# Patient Record
Sex: MALE | Race: WHITE | Employment: OTHER | URBAN - METROPOLITAN AREA
[De-identification: names, ages, dates, MRNs, and addresses within clinical notes are randomized per-mention and may not be internally consistent; named-entity substitution may affect disease eponyms.]

---

## 2020-05-25 ENCOUNTER — APPOINTMENT (OUTPATIENT)
Dept: GENERAL RADIOLOGY | Age: 65
End: 2020-05-25
Attending: STUDENT IN AN ORGANIZED HEALTH CARE EDUCATION/TRAINING PROGRAM
Payer: SUBSIDIZED

## 2020-05-25 ENCOUNTER — HOSPITAL ENCOUNTER (OUTPATIENT)
Age: 65
Setting detail: OBSERVATION
Discharge: HOME | End: 2020-05-26
Attending: EMERGENCY MEDICINE | Admitting: INTERNAL MEDICINE
Payer: SUBSIDIZED

## 2020-05-25 DIAGNOSIS — R07.9 CHEST PAIN, UNSPECIFIED TYPE: Primary | ICD-10-CM

## 2020-05-25 DIAGNOSIS — R94.31 ABNORMAL FINDING ON EKG: ICD-10-CM

## 2020-05-25 LAB
ALBUMIN SERPL-MCNC: 3.7 G/DL (ref 3.5–5)
ALBUMIN/GLOB SERPL: 1 {RATIO} (ref 1.1–2.2)
ALP SERPL-CCNC: 104 U/L (ref 45–117)
ALT SERPL-CCNC: 37 U/L (ref 12–78)
ANION GAP SERPL CALC-SCNC: 6 MMOL/L (ref 5–15)
AST SERPL-CCNC: 30 U/L (ref 15–37)
BASOPHILS # BLD: 0 K/UL (ref 0–0.1)
BASOPHILS NFR BLD: 1 % (ref 0–1)
BILIRUB SERPL-MCNC: 0.4 MG/DL (ref 0.2–1)
BUN SERPL-MCNC: 16 MG/DL (ref 6–20)
BUN/CREAT SERPL: 15 (ref 12–20)
CALCIUM SERPL-MCNC: 8.5 MG/DL (ref 8.5–10.1)
CHLORIDE SERPL-SCNC: 105 MMOL/L (ref 97–108)
CO2 SERPL-SCNC: 25 MMOL/L (ref 21–32)
COMMENT, HOLDF: NORMAL
CREAT SERPL-MCNC: 1.07 MG/DL (ref 0.7–1.3)
D DIMER PPP FEU-MCNC: 2.46 MG/L FEU (ref 0–0.65)
DIFFERENTIAL METHOD BLD: NORMAL
EOSINOPHIL # BLD: 0.1 K/UL (ref 0–0.4)
EOSINOPHIL NFR BLD: 1 % (ref 0–7)
ERYTHROCYTE [DISTWIDTH] IN BLOOD BY AUTOMATED COUNT: 12.1 % (ref 11.5–14.5)
GLOBULIN SER CALC-MCNC: 3.8 G/DL (ref 2–4)
GLUCOSE SERPL-MCNC: 124 MG/DL (ref 65–100)
HCT VFR BLD AUTO: 47.2 % (ref 36.6–50.3)
HGB BLD-MCNC: 16 G/DL (ref 12.1–17)
IMM GRANULOCYTES # BLD AUTO: 0 K/UL (ref 0–0.04)
IMM GRANULOCYTES NFR BLD AUTO: 0 % (ref 0–0.5)
LYMPHOCYTES # BLD: 1.1 K/UL (ref 0.8–3.5)
LYMPHOCYTES NFR BLD: 18 % (ref 12–49)
MCH RBC QN AUTO: 31.9 PG (ref 26–34)
MCHC RBC AUTO-ENTMCNC: 33.9 G/DL (ref 30–36.5)
MCV RBC AUTO: 94 FL (ref 80–99)
MONOCYTES # BLD: 0.3 K/UL (ref 0–1)
MONOCYTES NFR BLD: 5 % (ref 5–13)
NEUTS SEG # BLD: 4.5 K/UL (ref 1.8–8)
NEUTS SEG NFR BLD: 75 % (ref 32–75)
NRBC # BLD: 0 K/UL (ref 0–0.01)
NRBC BLD-RTO: 0 PER 100 WBC
PLATELET # BLD AUTO: 188 K/UL (ref 150–400)
PMV BLD AUTO: 10.3 FL (ref 8.9–12.9)
POTASSIUM SERPL-SCNC: 4 MMOL/L (ref 3.5–5.1)
PROT SERPL-MCNC: 7.5 G/DL (ref 6.4–8.2)
RBC # BLD AUTO: 5.02 M/UL (ref 4.1–5.7)
SAMPLES BEING HELD,HOLD: NORMAL
SODIUM SERPL-SCNC: 136 MMOL/L (ref 136–145)
TROPONIN I SERPL-MCNC: <0.05 NG/ML
TROPONIN I SERPL-MCNC: <0.05 NG/ML
WBC # BLD AUTO: 6 K/UL (ref 4.1–11.1)

## 2020-05-25 PROCEDURE — 36415 COLL VENOUS BLD VENIPUNCTURE: CPT

## 2020-05-25 PROCEDURE — 85379 FIBRIN DEGRADATION QUANT: CPT

## 2020-05-25 PROCEDURE — 99218 HC RM OBSERVATION: CPT

## 2020-05-25 PROCEDURE — 74011250637 HC RX REV CODE- 250/637: Performed by: EMERGENCY MEDICINE

## 2020-05-25 PROCEDURE — 84484 ASSAY OF TROPONIN QUANT: CPT

## 2020-05-25 PROCEDURE — 93005 ELECTROCARDIOGRAM TRACING: CPT

## 2020-05-25 PROCEDURE — 99284 EMERGENCY DEPT VISIT MOD MDM: CPT

## 2020-05-25 PROCEDURE — 85025 COMPLETE CBC W/AUTO DIFF WBC: CPT

## 2020-05-25 PROCEDURE — 71046 X-RAY EXAM CHEST 2 VIEWS: CPT

## 2020-05-25 PROCEDURE — 80053 COMPREHEN METABOLIC PANEL: CPT

## 2020-05-25 RX ORDER — SODIUM CHLORIDE 0.9 % (FLUSH) 0.9 %
5-40 SYRINGE (ML) INJECTION AS NEEDED
Status: DISCONTINUED | OUTPATIENT
Start: 2020-05-25 | End: 2020-05-26 | Stop reason: HOSPADM

## 2020-05-25 RX ORDER — HYDRALAZINE HYDROCHLORIDE 20 MG/ML
10 INJECTION INTRAMUSCULAR; INTRAVENOUS
Status: DISCONTINUED | OUTPATIENT
Start: 2020-05-25 | End: 2020-05-26 | Stop reason: HOSPADM

## 2020-05-25 RX ORDER — LISINOPRIL 10 MG/1
10 TABLET ORAL DAILY
Status: DISCONTINUED | OUTPATIENT
Start: 2020-05-26 | End: 2020-05-26 | Stop reason: HOSPADM

## 2020-05-25 RX ORDER — LISINOPRIL 5 MG/1
5 TABLET ORAL DAILY
Status: ON HOLD | COMMUNITY
End: 2020-05-26 | Stop reason: SDUPTHER

## 2020-05-25 RX ORDER — NITROGLYCERIN 0.4 MG/1
0.4 TABLET SUBLINGUAL
Status: DISCONTINUED | OUTPATIENT
Start: 2020-05-25 | End: 2020-05-25

## 2020-05-25 RX ORDER — SODIUM CHLORIDE 0.9 % (FLUSH) 0.9 %
5-40 SYRINGE (ML) INJECTION EVERY 8 HOURS
Status: DISCONTINUED | OUTPATIENT
Start: 2020-05-25 | End: 2020-05-26 | Stop reason: HOSPADM

## 2020-05-25 RX ADMIN — NITROGLYCERIN 1 INCH: 20 OINTMENT TOPICAL at 20:46

## 2020-05-25 RX ADMIN — Medication 10 ML: at 22:47

## 2020-05-25 NOTE — ED TRIAGE NOTES
Triage Note: Patient is coming in with chest pain for about 90 minutes. Denies SOB but states having some nausea. Denies any previous cardiac history. Patient states pain started while watching TV today.

## 2020-05-25 NOTE — ED PROVIDER NOTES
HPI     23-year-old male with a history of high blood pressure and high cholesterol presents the emergency department with intermittent chest burning since about 2:00 today. He reports associated nausea and shortness of breath. Denies diaphoresis or radiation of pain. He denies any recent exertional chest pain shortness of breath. He has no calf pain or swelling. He denies any fever or cough. Denies any known COVID-19 exposure. He started lisinopril for the first time today. He states his brother had bypass surgery and his dad had his first heart attack in his 45s. He quit smoking in . He currently currently has 6 out of 10 \"burning\" in his chest.    Past Medical History:   Diagnosis Date    Essential hypertension     Hyperlipidemia        No past surgical history on file. No family history on file.     Social History     Socioeconomic History    Marital status:      Spouse name: Not on file    Number of children: Not on file    Years of education: Not on file    Highest education level: Not on file   Occupational History    Not on file   Social Needs    Financial resource strain: Not on file    Food insecurity     Worry: Not on file     Inability: Not on file    Transportation needs     Medical: Not on file     Non-medical: Not on file   Tobacco Use    Smoking status: Former Smoker     Last attempt to quit: 2002     Years since quittin.8    Smokeless tobacco: Never Used   Substance and Sexual Activity    Alcohol use: Not on file    Drug use: Not on file    Sexual activity: Not on file   Lifestyle    Physical activity     Days per week: Not on file     Minutes per session: Not on file    Stress: Not on file   Relationships    Social connections     Talks on phone: Not on file     Gets together: Not on file     Attends Mandaen service: Not on file     Active member of club or organization: Not on file     Attends meetings of clubs or organizations: Not on file Relationship status: Not on file    Intimate partner violence     Fear of current or ex partner: Not on file     Emotionally abused: Not on file     Physically abused: Not on file     Forced sexual activity: Not on file   Other Topics Concern    Not on file   Social History Narrative    Not on file         ALLERGIES: Penicillins    Review of Systems   Constitutional: Negative for fever. HENT: Negative for congestion. Eyes: Negative for visual disturbance. Respiratory: Positive for chest tightness and shortness of breath. Negative for cough. Cardiovascular: Positive for chest pain. Gastrointestinal: Positive for nausea. Negative for abdominal pain and vomiting. Genitourinary: Negative for dysuria. Musculoskeletal: Negative for gait problem. Skin: Negative for rash. Neurological: Negative for headaches. Psychiatric/Behavioral: Negative for dysphoric mood. Vitals:    05/25/20 1652   BP: 187/72   Pulse: 62   Resp: 20   Temp: 98 °F (36.7 °C)   SpO2: 94%   Weight: 90.7 kg (200 lb)   Height: 5' 7\" (1.702 m)            Physical Exam  Constitutional:       General: He is not in acute distress. Appearance: He is well-developed. HENT:      Head: Normocephalic and atraumatic. Mouth/Throat:      Pharynx: No oropharyngeal exudate. Eyes:      General: No scleral icterus. Right eye: No discharge. Left eye: No discharge. Pupils: Pupils are equal, round, and reactive to light. Neck:      Musculoskeletal: Normal range of motion and neck supple. Vascular: No JVD. Cardiovascular:      Rate and Rhythm: Normal rate and regular rhythm. Heart sounds: Normal heart sounds. No murmur. Pulmonary:      Effort: Pulmonary effort is normal. No respiratory distress. Breath sounds: Normal breath sounds. No stridor. No wheezing or rales. Chest:      Chest wall: No tenderness. Abdominal:      General: Bowel sounds are normal. There is no distension. Palpations: Abdomen is soft. There is no mass. Tenderness: There is no abdominal tenderness. There is no guarding or rebound. Musculoskeletal: Normal range of motion. Skin:     General: Skin is warm and dry. Capillary Refill: Capillary refill takes less than 2 seconds. Findings: No rash. Neurological:      Mental Status: He is oriented to person, place, and time. Psychiatric:         Behavior: Behavior normal.         Thought Content: Thought content normal.         Judgment: Judgment normal.          MDM       Procedures        ED EKG interpretation:  Rhythm: sinus bradycardia; and regular . Rate (approx.): 59; Axis: normal; P wave: normal; QRS interval:  New T wave inversion in aVL compared to prior EKG of 2011This EKG was interpreted by Marlena Hurley MD,ED Provider. Given risk factors, new flipped t wave in avL will try nitro for pain and plan for admission. Pain resolved prior to RN giving nitro so Nitro paste was ordered instead      Hospitalist Perfect Serve for Admission  8:31 PM    ED Room Number: ER09/09  Patient Name and age:  Yamil Shelton 60 y.o.  male  Working Diagnosis:   1. Chest pain, unspecified type    2. Abnormal finding on EKG        COVID-19 Suspicion:  no    Code Status:  Full Code  Readmission: no  Isolation Requirements:  no  Recommended Level of Care:  telemetry  Department:[facility] Adult ED - (21 [phone]  Other:  60year old male with htn, chol, strong family history of CAD presents with chest burning with SOB. New flipped T wave in aVL. Initial trop neg. Took an aspirin this morning. I've put nitro paste on.

## 2020-05-26 ENCOUNTER — APPOINTMENT (OUTPATIENT)
Dept: NON INVASIVE DIAGNOSTICS | Age: 65
End: 2020-05-26
Attending: INTERNAL MEDICINE
Payer: SUBSIDIZED

## 2020-05-26 ENCOUNTER — APPOINTMENT (OUTPATIENT)
Dept: NUCLEAR MEDICINE | Age: 65
End: 2020-05-26
Attending: INTERNAL MEDICINE
Payer: SUBSIDIZED

## 2020-05-26 ENCOUNTER — APPOINTMENT (OUTPATIENT)
Dept: CT IMAGING | Age: 65
End: 2020-05-26
Attending: INTERNAL MEDICINE
Payer: SUBSIDIZED

## 2020-05-26 VITALS
HEART RATE: 62 BPM | WEIGHT: 208.56 LBS | HEIGHT: 67 IN | OXYGEN SATURATION: 96 % | RESPIRATION RATE: 18 BRPM | BODY MASS INDEX: 32.73 KG/M2 | SYSTOLIC BLOOD PRESSURE: 175 MMHG | TEMPERATURE: 98.2 F | DIASTOLIC BLOOD PRESSURE: 57 MMHG

## 2020-05-26 LAB
ATRIAL RATE: 59 BPM
CALCULATED P AXIS, ECG09: 14 DEGREES
CALCULATED R AXIS, ECG10: 1 DEGREES
CALCULATED T AXIS, ECG11: 72 DEGREES
CHOLEST SERPL-MCNC: 277 MG/DL
DIAGNOSIS, 93000: NORMAL
EST. AVERAGE GLUCOSE BLD GHB EST-MCNC: 120 MG/DL
HBA1C MFR BLD: 5.8 % (ref 4–5.6)
HDLC SERPL-MCNC: 48 MG/DL
HDLC SERPL: 5.8 {RATIO} (ref 0–5)
LDLC SERPL CALC-MCNC: 191.6 MG/DL (ref 0–100)
LIPID PROFILE,FLP: ABNORMAL
P-R INTERVAL, ECG05: 186 MS
Q-T INTERVAL, ECG07: 442 MS
QRS DURATION, ECG06: 76 MS
QTC CALCULATION (BEZET), ECG08: 437 MS
STRESS BASELINE DIAS BP: 86 MMHG
STRESS BASELINE HR: 51 BPM
STRESS BASELINE SYS BP: 186 MMHG
STRESS ESTIMATED WORKLOAD: 1 METS
STRESS EXERCISE DUR MIN: NORMAL
STRESS PEAK DIAS BP: 87 MMHG
STRESS PEAK SYS BP: 191 MMHG
STRESS PERCENT HR ACHIEVED: 53 %
STRESS POST PEAK HR: 83 BPM
STRESS RATE PRESSURE PRODUCT: NORMAL BPM*MMHG
STRESS STAGE RECOVERY 1 BP: NORMAL MMHG
STRESS STAGE RECOVERY 1 HR: 64 BPM
STRESS TARGET HR: 156 BPM
TRIGL SERPL-MCNC: 187 MG/DL (ref ?–150)
TROPONIN I SERPL-MCNC: <0.05 NG/ML
TSH SERPL DL<=0.05 MIU/L-ACNC: 1.82 UIU/ML (ref 0.36–3.74)
VENTRICULAR RATE, ECG03: 59 BPM
VLDLC SERPL CALC-MCNC: 37.4 MG/DL

## 2020-05-26 PROCEDURE — 99218 HC RM OBSERVATION: CPT

## 2020-05-26 PROCEDURE — 74011250637 HC RX REV CODE- 250/637: Performed by: INTERNAL MEDICINE

## 2020-05-26 PROCEDURE — A9500 TC99M SESTAMIBI: HCPCS

## 2020-05-26 PROCEDURE — 74011636320 HC RX REV CODE- 636/320: Performed by: RADIOLOGY

## 2020-05-26 PROCEDURE — 71275 CT ANGIOGRAPHY CHEST: CPT

## 2020-05-26 PROCEDURE — 78452 HT MUSCLE IMAGE SPECT MULT: CPT

## 2020-05-26 PROCEDURE — 83036 HEMOGLOBIN GLYCOSYLATED A1C: CPT

## 2020-05-26 PROCEDURE — 74011250637 HC RX REV CODE- 250/637: Performed by: SPECIALIST

## 2020-05-26 PROCEDURE — 74011250636 HC RX REV CODE- 250/636: Performed by: INTERNAL MEDICINE

## 2020-05-26 PROCEDURE — 80061 LIPID PANEL: CPT

## 2020-05-26 PROCEDURE — 36415 COLL VENOUS BLD VENIPUNCTURE: CPT

## 2020-05-26 PROCEDURE — 84484 ASSAY OF TROPONIN QUANT: CPT

## 2020-05-26 PROCEDURE — 84443 ASSAY THYROID STIM HORMONE: CPT

## 2020-05-26 RX ORDER — SODIUM CHLORIDE 0.9 % (FLUSH) 0.9 %
10 SYRINGE (ML) INJECTION
Status: COMPLETED | OUTPATIENT
Start: 2020-05-26 | End: 2020-05-26

## 2020-05-26 RX ORDER — SODIUM CHLORIDE 0.9 % (FLUSH) 0.9 %
20 SYRINGE (ML) INJECTION
Status: COMPLETED | OUTPATIENT
Start: 2020-05-26 | End: 2020-05-26

## 2020-05-26 RX ORDER — AMLODIPINE BESYLATE 10 MG/1
10 TABLET ORAL DAILY
Qty: 30 TAB | Refills: 0 | Status: SHIPPED | OUTPATIENT
Start: 2020-05-27 | End: 2020-06-26

## 2020-05-26 RX ORDER — PANTOPRAZOLE SODIUM 40 MG/1
40 TABLET, DELAYED RELEASE ORAL DAILY
Status: DISCONTINUED | OUTPATIENT
Start: 2020-05-26 | End: 2020-05-26 | Stop reason: HOSPADM

## 2020-05-26 RX ORDER — LISINOPRIL 5 MG/1
10 TABLET ORAL DAILY
Qty: 60 TAB | Refills: 0 | Status: SHIPPED | OUTPATIENT
Start: 2020-05-26 | End: 2020-06-25

## 2020-05-26 RX ORDER — PANTOPRAZOLE SODIUM 40 MG/1
40 TABLET, DELAYED RELEASE ORAL DAILY
Qty: 30 TAB | Refills: 0 | Status: SHIPPED | OUTPATIENT
Start: 2020-05-27 | End: 2020-06-26

## 2020-05-26 RX ORDER — ATORVASTATIN CALCIUM 40 MG/1
40 TABLET, FILM COATED ORAL
Qty: 30 TAB | Refills: 0 | Status: SHIPPED | OUTPATIENT
Start: 2020-05-26 | End: 2020-06-25

## 2020-05-26 RX ORDER — ATORVASTATIN CALCIUM 40 MG/1
40 TABLET, FILM COATED ORAL
Status: DISCONTINUED | OUTPATIENT
Start: 2020-05-26 | End: 2020-05-26 | Stop reason: HOSPADM

## 2020-05-26 RX ORDER — AMLODIPINE BESYLATE 5 MG/1
10 TABLET ORAL DAILY
Status: DISCONTINUED | OUTPATIENT
Start: 2020-05-26 | End: 2020-05-26 | Stop reason: HOSPADM

## 2020-05-26 RX ADMIN — IOPAMIDOL 100 ML: 755 INJECTION, SOLUTION INTRAVENOUS at 01:19

## 2020-05-26 RX ADMIN — REGADENOSON 0.4 MG: 0.08 INJECTION, SOLUTION INTRAVENOUS at 12:28

## 2020-05-26 RX ADMIN — PANTOPRAZOLE SODIUM 40 MG: 40 TABLET, DELAYED RELEASE ORAL at 10:05

## 2020-05-26 RX ADMIN — AMLODIPINE BESYLATE 10 MG: 5 TABLET ORAL at 15:27

## 2020-05-26 RX ADMIN — Medication 10 ML: at 01:19

## 2020-05-26 RX ADMIN — LISINOPRIL 10 MG: 10 TABLET ORAL at 10:05

## 2020-05-26 RX ADMIN — Medication 20 ML: at 12:28

## 2020-05-26 RX ADMIN — Medication 10 ML: at 15:28

## 2020-05-26 NOTE — PROGRESS NOTES
Problem: Falls - Risk of  Goal: *Absence of Falls  Description: Document Jamir Cota Fall Risk and appropriate interventions in the flowsheet.   Outcome: Progressing Towards Goal  Note: Fall Risk Interventions:     Medication Interventions: Teach patient to arise slowly, Patient to call before getting OOB       Problem: Unstable angina/NSTEMI: Day of Admission/Day 1  Goal: Nutrition/Diet  Outcome: Progressing Towards Goal  Goal: Psychosocial  Outcome: Progressing Towards Goal  Goal: *Hemodynamically stable  Outcome: Progressing Towards Goal  Goal: *Optimal pain control at patient's stated goal  Outcome: Progressing Towards Goal  Goal: *Lungs clear or at baseline  Outcome: Progressing Towards Goal

## 2020-05-26 NOTE — PROGRESS NOTES
0750 Bedside and Verbal shift change report given to NATHAN Mcneil (oncoming nurse) by Isai Tony (offgoing nurse). Report included the following information SBAR, Kardex, Intake/Output, MAR, Recent Results, Med Rec Status and Cardiac Rhythm NSR/SB. 1445 IVs and telemetry removed. 1455  I have reviewed discharge instructions with the patient. The patient verbalized understanding. Discharge medications reviewed with patient and appropriate educational materials and side effects teaching were provided. MEFs given to pt. Opportunity for questions provided. 1515 Dr. Simran Howard 10 mg Norvasc and check BP in 1.5 hr.     1525 Telemetry placed. Pt updated. Norvasc 10mg administered. Will recheck BP at 1700.     1715 /57. Dr. Simran Howard to discharge patient after he updates the AVS.     1745 Discharge medications reviewed with patient and appropriate educational materials and side effects teaching were provided. I have reviewed discharge instructions with the patient. The patient verbalized understanding. MEFs given to pt. Opportunity for questions provided. 1800 Telemetry removed. Problem: Falls - Risk of  Goal: *Absence of Falls  Description: Document Chris Santo Fall Risk and appropriate interventions in the flowsheet.   Outcome: Progressing Towards Goal  Note: Fall Risk Interventions:            Medication Interventions: Evaluate medications/consider consulting pharmacy, Patient to call before getting OOB, Teach patient to arise slowly, Utilize gait belt for transfers/ambulation                   Problem: Patient Education: Go to Patient Education Activity  Goal: Patient/Family Education  Outcome: Progressing Towards Goal     Problem: Unstable angina/NSTEMI: Day 2  Goal: Activity/Safety  Outcome: Progressing Towards Goal  Goal: Diagnostic Test/Procedures  Outcome: Progressing Towards Goal  Goal: Psychosocial  Outcome: Progressing Towards Goal     Problem: Unstable Angina/NSTEMI: Discharge Outcomes  Goal: *Optimal pain control at patient's stated goal  Outcome: Progressing Towards Goal

## 2020-05-26 NOTE — H&P
[address] [facility] Adult  Hospitalist Group  History and Physical    Primary Care Provider: Adi Orozco MD  Date of Service:  5/25/2020    Subjective:     Jennifer  is a 60 y.o. male with PMH of HTN and hyperlipidemia presents to the ER c/o substernal chest pain that started this afternoon. Patient stated that he was sitting when he suddenly backslash experiencing a burning chest pain 10 out of 10 in the substernal area. It did not radiate. He was associated with dizziness and shortness of breath. It lasted a few seconds and went away. Then he went to the ER and experiencing same pain with intensity this time lasting longer and having associated nausea and dizziness. Since the first episode this afternoon he has had another 5 episode of the same chest pain. It comes and goes. He denies associated headache, abdominal pain, indigestion, diarrhea, fever, chills. No shortness of breath at this time. He prior to this episode he has not noticed dyspnea exertion, chest pain with exertion or decreased exercise tolerance. Last patient had a stress test was 20 years ago and it was done at [facility] for routine physical.  As per patient he was told that it was unremarkable. Patient has history of hypertension has not been on medication. He was seen at the clinic 3 days ago due to have a general checkup. He was started on lisinopril 5 mg for hypertension. He has not been checking his blood pressure at home. Today in the emergency room work-up was unremarkable. He was found to be hypertensive with a systolic blood pressure in the 180s. No medication started. Currently asymptomatic. He has a significant family history of heart disease including father and brothers. Given his risk factor admission for cardiac work-up requested. Review of Systems:    Review of Systems   Constitutional: Negative for chills, fever, malaise/fatigue and weight loss.    HENT: Negative for congestion, ear discharge and hearing loss. Eyes: Negative for blurred vision and double vision. Respiratory: Positive for shortness of breath. Negative for cough, sputum production and wheezing. Cardiovascular: Positive for chest pain. Negative for palpitations, orthopnea, leg swelling and PND. Gastrointestinal: Negative for abdominal pain, constipation, diarrhea, melena, nausea and vomiting. Genitourinary: Negative for dysuria, frequency and urgency. Musculoskeletal: Negative for back pain, joint pain and myalgias. Skin: Negative for itching and rash. Neurological: Positive for dizziness. Negative for sensory change, speech change, focal weakness, weakness and headaches. Endo/Heme/Allergies: Negative for polydipsia. Does not bruise/bleed easily. Past Medical History:   Diagnosis Date    Essential hypertension     Hyperlipidemia       Past Surgical History:   Procedure Laterality Date    HX CATARACT REMOVAL      HX ORTHOPAEDIC Right     right shoulder     Prior to Admission medications    Medication Sig Start Date End Date Taking? Authorizing Provider   lisinopriL (PRINIVIL, ZESTRIL) 5 mg tablet Take 5 mg by mouth daily. Yes Provider, Historical   venlafaxine-SR (EFFEXOR XR) 150 mg capsule Take  by mouth daily. Provider, Historical     Allergies   Allergen Reactions    Penicillins Rash      Family History   Problem Relation Age of Onset    Cancer Mother     Heart Disease Father     Heart Disease Brother         SOCIAL HISTORY:  Patient resides at  Home with wife. Patient ambulates without assistance. Smoking history: Former smoker quit 18 years ago.    Alcohol history: none        Objective:       Physical Exam:   Patient Vitals for the past 12 hrs:   Temp Pulse Resp BP SpO2   05/25/20 2100 98.1 °F (36.7 °C) (!) 56 22 136/51 96 %   05/25/20 1652 98 °F (36.7 °C) 62 20 187/72 94 %     GEN APPEARANCE: Patient resting in bed in NAD  HEENT: Conjunctiva Clear  CVS: RRR, S1, S2; No M/G/R  LUNGS: CTAB; No Wheezes; No Rhonchi: No rales  ABD: Soft; No TTP; + Normoactive BS  EXT: WWP, no edema   Skin exam: No gross lesions noted on exposed skin surfaces  MENTAL STATUS: Answers questions appropriately, responds to commands. NEURO: Cranial nerve exam: EOMI, CN V sensory/motor intact, no facial asymmetry noted, patient able to hearl, uvula midline, able to move tongue left/right. No gross motor or sensory deficits      Data Review:   Recent Results (from the past 24 hour(s))   EKG, 12 LEAD, INITIAL    Collection Time: 05/25/20  4:57 PM   Result Value Ref Range    Ventricular Rate 59 BPM    Atrial Rate 59 BPM    P-R Interval 186 ms    QRS Duration 76 ms    Q-T Interval 442 ms    QTC Calculation (Bezet) 437 ms    Calculated P Axis 14 degrees    Calculated R Axis 1 degrees    Calculated T Axis 72 degrees    Diagnosis Sinus bradycardia  No previous ECGs available      CBC WITH AUTOMATED DIFF    Collection Time: 05/25/20  5:10 PM   Result Value Ref Range    WBC 6.0 4.1 - 11.1 K/uL    RBC 5.02 4.10 - 5.70 M/uL    HGB 16.0 12.1 - 17.0 g/dL    HCT 47.2 36.6 - 50.3 %    MCV 94.0 80.0 - 99.0 FL    MCH 31.9 26.0 - 34.0 PG    MCHC 33.9 30.0 - 36.5 g/dL    RDW 12.1 11.5 - 14.5 %    PLATELET 808 307 - 125 K/uL    MPV 10.3 8.9 - 12.9 FL    NRBC 0.0 0  WBC    ABSOLUTE NRBC 0.00 0.00 - 0.01 K/uL    NEUTROPHILS 75 32 - 75 %    LYMPHOCYTES 18 12 - 49 %    MONOCYTES 5 5 - 13 %    EOSINOPHILS 1 0 - 7 %    BASOPHILS 1 0 - 1 %    IMMATURE GRANULOCYTES 0 0.0 - 0.5 %    ABS. NEUTROPHILS 4.5 1.8 - 8.0 K/UL    ABS. LYMPHOCYTES 1.1 0.8 - 3.5 K/UL    ABS. MONOCYTES 0.3 0.0 - 1.0 K/UL    ABS. EOSINOPHILS 0.1 0.0 - 0.4 K/UL    ABS. BASOPHILS 0.0 0.0 - 0.1 K/UL    ABS. IMM.  GRANS. 0.0 0.00 - 0.04 K/UL    DF AUTOMATED     METABOLIC PANEL, COMPREHENSIVE    Collection Time: 05/25/20  5:10 PM   Result Value Ref Range    Sodium 136 136 - 145 mmol/L    Potassium 4.0 3.5 - 5.1 mmol/L    Chloride 105 97 - 108 mmol/L    CO2 25 21 - 32 mmol/L Anion gap 6 5 - 15 mmol/L    Glucose 124 (H) 65 - 100 mg/dL    BUN 16 6 - 20 MG/DL    Creatinine 1.07 0.70 - 1.30 MG/DL    BUN/Creatinine ratio 15 12 - 20      GFR est AA >60 >60 ml/min/1.73m2    GFR est non-AA >60 >60 ml/min/1.73m2    Calcium 8.5 8.5 - 10.1 MG/DL    Bilirubin, total 0.4 0.2 - 1.0 MG/DL    ALT (SGPT) 37 12 - 78 U/L    AST (SGOT) 30 15 - 37 U/L    Alk. phosphatase 104 45 - 117 U/L    Protein, total 7.5 6.4 - 8.2 g/dL    Albumin 3.7 3.5 - 5.0 g/dL    Globulin 3.8 2.0 - 4.0 g/dL    A-G Ratio 1.0 (L) 1.1 - 2.2     TROPONIN I    Collection Time: 05/25/20  5:10 PM   Result Value Ref Range    Troponin-I, Qt. <0.05 <0.05 ng/mL   SAMPLES BEING HELD    Collection Time: 05/25/20  5:10 PM   Result Value Ref Range    SAMPLES BEING HELD 1RED, 1BLU     COMMENT        Add-on orders for these samples will be processed based on acceptable specimen integrity and analyte stability, which may vary by analyte. TROPONIN I    Collection Time: 05/25/20  8:02 PM   Result Value Ref Range    Troponin-I, Qt. <0.05 <0.05 ng/mL     Xr Chest Pa Lat    Result Date: 5/25/2020  Impression: 1. No acute cardiopulmonary disease       Assessment:     Active Problems:    Chest pain (5/25/2020)        Plan:     1. Atypical chest pain- concerned for CAD given presentation and risk factors. - Stress test in am  - Cardiology consult  - trend Cardiac enzymes  - Aspirin 81mg daily  - Nitroglycerin sublingual  PRN  - Check A1c, TSH, lipid panel     2. Hypertension: uncontrolled hypertension due to lack of compliance with meds  - Increase dose of lisinopril from 5mg daily to 10mg daily. 3. Hyperlipidemia: not on therapy. Check lipid panel. 4. Hyperglycemia: no h/o DM. Check a1c    5. Obesity: BMI 30. Patient will benefit from outpatient management for weight loss, lipid and blood pressure control. DVT prophy: SCD  Code status: Full code    Admit to: telemetry.  Observation     FUNCTIONAL STATUS PRIOR TO HOSPITALIZATION Ambulates Independently     Signed By: Natasha Monsivais MD     May 25, 2020

## 2020-05-26 NOTE — ROUTINE PROCESS
TRANSFER - OUT REPORT: 
 
Verbal report given to [place], RN(name) on [facility] P[facility]  being transferred to [facility](unit) for routine progression of care Report consisted of patients Situation, Background, Assessment and  
Recommendations(SBAR). Information from the following report(s) SBAR, ED Summary, MAR, Recent Results and Cardiac Rhythm SR was reviewed with the receiving nurse. Lines:  
Peripheral IV 05/25/20 Left Antecubital (Active) Site Assessment Clean, dry, & intact 5/25/2020  5:15 PM  
Phlebitis Assessment 0 5/25/2020  5:15 PM  
Infiltration Assessment 0 5/25/2020  5:15 PM  
Dressing Status Clean, dry, & intact 5/25/2020  5:15 PM  
Dressing Type Transparent 5/25/2020  5:15 PM  
Hub Color/Line Status Patent; Flushed;Capped;Pink 5/25/2020  5:15 PM  
Action Taken Blood drawn 5/25/2020  5:15 PM  
  
 
Opportunity for questions and clarification was provided.    
 
Patient transported with: 
 
 
Layton Hair RN

## 2020-05-26 NOTE — PROGRESS NOTES
05/26/20 1515   Vitals   Temp 98.2 °F (36.8 °C)   Temp Source Oral   Pulse (Heart Rate) 60   Heart Rate Source Monitor   Resp Rate 18   O2 Sat (%) 96 %   Level of Consciousness Alert   BP (!) 205/76  (MD aware)   MAP (Monitor) 127   BP 1 Location Left arm   BP 1 Method Automatic   BP Patient Position Sitting   Cardiac Rhythm NSR   MEWS Score 3   Alarms Set and Audible Cardiac alarms   Box Number 21   Electrodes Replaced No     MEWs: 3. Dr. Claudia Schmitt updated. Delay discharge, administer 10 mg of PO norvasc. Telemetry placed and monitor tech aware. Wife, Baby Billy, updated. VORB to recheck vitals 1.5 hr after norvasc.

## 2020-05-26 NOTE — CONSULTS
Cardiology Consult Note      Patient Name: Aisha Alberto  : [birth year] MRN: [id number]  Date: 2020  Time: 11:02 AM    Admit Diagnosis: Chest pain [R07.9]    Primary Cardiologist: Calista Patricia MD   Consulting Cardiologist: Calista Patricia MD    Reason for Consult: chest pain    Requesting MD: Kirt Cardona MD    HPI:  Aisha Alberto is a 60 y.o. male admitted on 2020  for Chest pain [R07.9]. has a past medical history of Essential hypertension and Hyperlipidemia. Presented for chest burning. Began yesterday afternoon. Diffuse across entire chest.  No nausea or SOB. Last seen in our office 9 years ago for same symptoms. Exercise stress was inconclusive, suspected GERD. He was to return for further testing, but never showed. Denies Cp or SOB since, until yesterday. Subjective:  Denies CP, SOB or palpiations      Assessment and Plan     1. Chest pain - atypical   - Burning   - Troponin 0.05 x 3   - EKG without ACS   - Nuclear stress today  2. HTN   - Lisinopril 5 mg PTA  3. HLD   Cholesterol, total 277 (H) 2020 03:10 AM   HDL Cholesterol 48 2020 03:10 AM   LDL, calculated 191.6 (H) 2020 03:10 AM   VLDL, calculated 37.4 2020 03:10 AM   Triglyceride 187 (H) 2020 03:10 AM   CHOL/HDL Ratio 5.8 (H) 2020 03:10 AM    Started on Lipitor. 4. Body mass index is 32.66 kg/m². - obesity. S/sx more c/w GERD. Protonix started. He states he does have reflux. Nuclear stress today. Will follow up results. Cardiology Attending:Patient seen and examined. I agree with NP assessment and plans. Await stress test results. Chnog Mendez MD 2020 12:11 PM         Patient Active Problem List   Diagnosis Code    BP (high blood pressure) I10    Chest pain, unspecified R07.9    Other and unspecified hyperlipidemia E78.5    Chest pain R07.9     No specialty comments available.       Review of Systems: [] Patient unable to provide secondary to condition    [x] All systems negative, except as checked below. Constitutional:    []Weight Change  []Fever   []Chills   []Night Sweats  []Fatigue  []Malaise  []____  ENT/Mouth:     []Hearing Changes  []Ear Pain  []Nasal Congestion   []Sinus Pain  []Hoarseness   []Sore throat  []Rhinorrhea  []Swallowing Difficulty  []____  Eyes:    []Eye Pain  []Swelling  []Redness  []Foreign Body  []Discharge  []Vision Changes  []____  Cardiovascular:    [x]Chest Pain  []SOB  []PND  []TEAGUE  []Orthopnea  []Claudication  []Edema   []Palpitations  []____  Respiratory:    []Cough  []Sputum  []Wheezing,  []SOB  []Hemoptysis  []____  Gastrointestinal:    []Nausea  []Vomiting  []Diarrhea  []Constipation  []Pain  []Heartburn  []Anorexia  []Dysphagia  []Hematochezia  []Melena,  []Jaundice  []____  Genitourinary:    []Dysuria  []Urinary Frequency  []Hematuria  []Urinary Incontinence  []Urgency  []Flank Pain  []Hesitancy  []____  Musculoskeletal:    []Arthralgias  []Myalgias  []Joint Swelling  []Joint Stiffness  []Back Pain  []Neck Pain  []____  Skin:    []Skin Lesions  []Pruritis  []Hair Changes  []Skin rashes  []____  Neuro:    []Weakness  []Numbness  []Paresthesias  []Loss of Consciousness  []Syncope   []Dizziness  []Headache  []Coordination Changes  []Recent Falls  []____  Psych:    []Anxiety/Depression  []Insomnia  []Memory Changes  []Violence/Abuse Hx.  []____  Heme/Lymph:    []Bruising  []Bleeding  []Lymphadenopathy  []____  Endocrine:    []Polyuria  []Polydipsia  []Temperature Intolerance  []____         Previous treatment/evaluation includes   Exercise stress test  Cardiac risk factors:   smoking/ tobacco exposure, dyslipidemia, obesity, sedentary life style, male gender, hypertension.     Past Medical History:   Diagnosis Date    Essential hypertension     Hyperlipidemia      Past Surgical History:   Procedure Laterality Date    HX CATARACT REMOVAL      HX ORTHOPAEDIC Right     right shoulder     Current Facility-Administered Medications   Medication Dose Route Frequency    sodium chloride 0.9 % bolus infusion 100 mL  100 mL IntraVENous RAD ONCE    regadenoson (LEXISCAN) injection 0.4 mg  0.4 mg IntraVENous RAD ONCE    saline peripheral flush soln 20 mL  20 mL InterCATHeter RAD ONCE    atorvastatin (LIPITOR) tablet 40 mg  40 mg Oral QHS    pantoprazole (PROTONIX) tablet 40 mg  40 mg Oral DAILY    sodium chloride (NS) flush 5-40 mL  5-40 mL IntraVENous Q8H    sodium chloride (NS) flush 5-40 mL  5-40 mL IntraVENous PRN    lisinopriL (PRINIVIL, ZESTRIL) tablet 10 mg  10 mg Oral DAILY    hydrALAZINE (APRESOLINE) 20 mg/mL injection 10 mg  10 mg IntraVENous Q6H PRN       Allergies   Allergen Reactions    Penicillins Rash      Family History   Problem Relation Age of Onset    Cancer Mother     Heart Disease Father     Heart Disease Brother       Social History     Socioeconomic History    Marital status:      Spouse name: Not on file    Number of children: Not on file    Years of education: Not on file    Highest education level: Not on file   Tobacco Use    Smoking status: Former Smoker     Last attempt to quit: 2002     Years since quittin.8    Smokeless tobacco: Never Used       Objective:    Physical Exam    Vitals:   Vitals:    20 0023 20 0259 20 0516 20 0752   BP: 132/42 (!) 116/39  132/51   Pulse: (!) 49 (!) 49  (!) 55   Resp: 18 18  18   Temp: 97.7 °F (36.5 °C) 97.7 °F (36.5 °C)  97.7 °F (36.5 °C)   SpO2: 91% 95%  97%   Weight:   208 lb 8.9 oz (94.6 kg)    Height:           General:    Alert, cooperative, no distress, appears stated age. Neck:   Supple, no carotid bruit and no JVD. Back:     Symmetric, normal curvature. Lungs:     Clear to auscultation bilaterally. Heart[de-identified]    Regular rate and rhythm, S1, S2 normal, no murmur, click, rub or gallop. Abdomen:     Soft, non-tender.  Bowel sounds normal.    Extremities: Extremities normal, atraumatic, no cyanosis or edema. Vascular:   Pulses - 2+ radials   Skin:   Skin color normal. No rashes or lesions   Neurologic:   CN II-XII grossly intact. Telemetry: normal sinus rhythm    ECG:   EKG Results     Procedure 720 Value Units Date/Time    EKG 12 LEAD INITIAL [[id number]] Collected:  05/25/20 1657    Order Status:  Completed Updated:  05/25/20 1659     Ventricular Rate 59 BPM      Atrial Rate 59 BPM      P-R Interval 186 ms      QRS Duration 76 ms      Q-T Interval 442 ms      QTC Calculation (Bezet) 437 ms      Calculated P Axis 14 degrees      Calculated R Axis 1 degrees      Calculated T Axis 72 degrees      Diagnosis --     Sinus bradycardia  No previous ECGs available              Data Review:     Radiology:   XR Results (most recent):  Results from Hospital Encounter encounter on 05/25/20   XR CHEST PA LAT    Narrative INDICATION:  Chest pain. Symptoms for 90 minutes     Exam: Chest 2 views. Comparison: None. Findings: Cardiomediastinal silhouette is normal. Pulmonary vasculature is not  engorged. No focal parenchymal opacities, effusions, or pneumothorax. Bony  thorax is intact. Impression Impression:  1. No acute cardiopulmonary disease           Recent Labs     05/26/20  0310 05/25/20 2002 05/25/20  1710   TROIQ <0.05 <0.05 <0.05     Recent Labs     05/25/20  1710      K 4.0      CO2 25   BUN 16   CREA 1.07   *   CA 8.5     Recent Labs     05/25/20  1710   WBC 6.0   HGB 16.0   HCT 47.2        Recent Labs     05/25/20  1710   SGOT 30        Recent Labs     05/26/20  0310   CHOL 277*   LDLC 191.6*     Recent Labs     05/26/20  0310   TSH 1.82       Yrn Valerio MD         [facility] [address] [place] [address], [address] [place] [address] [address]     [facility]     [phone]    Avel Reid MD

## 2020-05-26 NOTE — PROGRESS NOTES
2225: TRANSFER - IN REPORT:    Verbal report received from SHAKILA Padilla(name) on [address]  being received from ED (unit) for routine progression of care      Report consisted of patients Situation, Background, Assessment and   Recommendations(SBAR). Information from the following report(s) SBAR, Kardex, Intake/Output, MAR, Recent Results, Med Rec Status and Cardiac Rhythm SB was reviewed with the receiving nurse. Opportunity for questions and clarification was provided. Assessment completed upon patients arrival to unit and care assumed. Primary Nurse Hamida Gutierres RN and Olivia Galan RN performed a dual skin assessment on this patient No impairment noted  Kemar score is 23.    0100: Patient down for STAT CTA.    0400: Patient resting in bed at his time without any pain at this time. 0730: Bedside and Verbal shift change report given to SHAKILA Back (oncoming nurse) by Donis Luong (offgoing nurse).  Report included the following information SBAR, Kardex, Intake/Output, MAR, Recent Results, Med Rec Status and Cardiac Rhythm SB.

## 2020-05-26 NOTE — PROGRESS NOTES
CM attempted to meet with the patient at bedside for initial assessment, patient currently off-the-floor for testing, CM will follow-up later today as able and appropriate. Per IDR rounds with bedside RN, patient is up at gilda and independent- patient is listed as uninsured, will follow-up with patient directly.  SIRIA Stewart

## 2020-05-26 NOTE — DISCHARGE INSTRUCTIONS
Discharge Instructions       PATIENT ID: Emilee Momin  MRN: [id number]   YOB: 1955    DATE OF ADMISSION: 5/25/2020  7:01 PM    DATE OF DISCHARGE: 5/26/2020    PRIMARY CARE PROVIDER: Marivel Blandon MD     ATTENDING PHYSICIAN: Prachi Pineda MD  DISCHARGING PROVIDER: Marian Zavaleta MD    To contact this individual call [phone] and ask the  to page. If unavailable ask to be transferred the Adult Hospitalist Department. DISCHARGE DIAGNOSES     - Atypical chest pain- concerned for CAD given presentation and risk factors. ACS R/O  Cardiac stress test Neg for reversible ischemia  Cont Baby asa  Given abnormal FLP, start on statins  Symptoms likely due to gerd, will place on PPI trial.     - Hypertension: uncontrolled hypertension due to lack of compliance with meds  Increase dose of lisinopril from 5mg daily to 10mg daily. Norvasc added  Please FU with PCP, check BP daily and record in Journal. Please be compliant with antihypertensives. Patient is advised to return to ER/seek medical care if symptoms recur/ worsen or new symptoms develop.     - Hyperlipidemia: Statins     - Hyperglycemia: no h/o DM. A1c 5.8. prediabetic range. Weight loss recommenced.     - Obesity: BMI 30. Patient will benefit from outpatient management for weight loss, lipid and blood pressure control.     CONSULTATIONS: IP CONSULT TO CARDIOLOGY    PROCEDURES/SURGERIES: * No surgery found *    PENDING TEST RESULTS:   At the time of discharge the following test results are still pending: n/a    FOLLOW UP APPOINTMENTS:   Follow-up Information     Follow up With Specialties Details Why Contact Info    Marivel Blandon MD [facility] Schedule an appointment as soon as possible for a visit in 1 week for post-hospitalization follow up, with in 7 days Vilma Cunningham 106      Alex Drummond MD Cardiology Schedule an appointment as soon as possible for a visit as recommended [address] dim[address]ri [facility]  [phone]             ADDITIONAL CARE RECOMMENDATIONS:   · It is important that you take the medication exactly as they are prescribed. · Keep your medication in the bottles provided by the pharmacist and keep a list of the medication names, dosages, and times to be taken in your wallet. · Do not take other medications without consulting your doctor. · No drinking alcohol or driving car or operating machinery if you are on narcotic pain medications. Donot take sedating mediations if you are sleepy or confused. · Fall Precautions  · Keep Well Hydrated  · Report to your medical provider if you feel you have  developed allergies to medications  · Follow up with your PCP or Consultant for medication adjustments and refills  · Monitor for signs of fevers,chills,b leeding,chest pain and seek medical attention if you do so. DIET: cardiac    ACTIVITY: Ambulate in house    WOUND CARE: n/a    EQUIPMENT needed: n/a      DISCHARGE MEDICATIONS:   See Medication Reconciliation Form    · It is important that you take the medication exactly as they are prescribed. · Keep your medication in the bottles provided by the pharmacist and keep a list of the medication names, dosages, and times to be taken in your wallet. · Do not take other medications without consulting your doctor. NOTIFY YOUR PHYSICIAN FOR ANY OF THE FOLLOWING:   Fever over 101 degrees for 24 hours. Chest pain, shortness of breath, fever, chills, nausea, vomiting, diarrhea, change in mentation, falling, weakness, bleeding. Severe pain or pain not relieved by medications. Or, any other signs or symptoms that you may have questions about.       DISPOSITION:   x Home With:   OT  PT  HH  RN       SNF/Inpatient Rehab/LTAC    Independent/assisted living    Hospice    Other:           Information obtained by :   I understand that if any problems occur once I am at home I am to contact my physician. I understand and acknowledge receipt of the instructions indicated above.                                                                                                                                              [de-identified] or R.N.'s Signature                                                                  Date/Time                                                                                                                                              Patient or Representative Signature                                                          Date/Time          Signed:   Marcia Reyna MD  5/26/2020  2:33 PM

## 2020-05-26 NOTE — DISCHARGE SUMMARY
Inpatient hospitalist discharge summary                Brief Overview    PATIENT ID: Lyndsey Sharma    MRN: [id number]     [facility] OF BIRTH: 1955    Admitting Provider: Dylon Montero MD    Discharging Provider: Servando Cuenca MD   To contact this individual call [phone] and ask the  to page. If unavailable ask to be transferred the Adult Hospitalist Department. PCP at discharge: Delvis Cotto, [address] [place], [place] [address]ise [address] / [facility] [id number]    Admission date: 5/25/2020  Date of Discharge: 05/26/20    Chief complaint:   Chief Complaint   Patient presents with    Chest Pain     Patient Active Problem List   Diagnosis Code    BP (high blood pressure) I10    Chest pain, unspecified R07.9    Other and unspecified hyperlipidemia E78.5    Chest pain R07.9         Discharge diagnosis, hospital course/plan:    - Atypical chest pain- concerned for CAD given presentation and risk factors. ACS R/O  Cardiac stress test Neg for reversible ischemia  Cont Baby asa  Given abnormal FLP, start on statins  Symptoms likely due to gerd, will place on PPI trial.     - Hypertension: uncontrolled hypertension due to lack of compliance with meds  Increase dose of lisinopril from 5mg daily to 10mg daily. Norvasc added  Please FU with PCP, check BP daily and record in Journal. Please be compliant with antihypertensives. Patient is advised to return to ER/seek medical care if symptoms recur/ worsen or new symptoms develop.     - Hyperlipidemia: Statins     - Hyperglycemia: no h/o DM. A1c 5.8. prediabetic range. Weight loss recommenced.     - Obesity: BMI 30. Patient will benefit from outpatient management for weight loss, lipid and blood pressure control. On the date of discharge, diagnostic face to face encounter was performed. Patient was hemodynamically stable, offering no new complaints. Denies any shortness of breath at rest, no fevers or chills, no diarrhea or constipation. Patient is agreeable for discharge. Patient understood and verbalized the understanding of the discharge plan. Patient was advised to seek medical help/ care or return to ED, if symptoms recur, worsen or new symptoms develop. Discharge Disposition:    home    Discharge activity:  Ambulate in house    Code status at discharge:  Full Code     Active issues requiring follow up:  HTN  HLP  GERD    Outpatient follow up:      Future appointments-  No future appointments. Follow-up Information     Follow up With Specialties Details Why Contact Info    Yaya Umaña MD [facility] Schedule an appointment as soon as possible for a visit in 1 week for post-hospitalization follow up, with in 7 days [address] [place] Matias MD Cardiology Schedule an appointment as soon as possible for a visit as recommended [address] [place] [facility]  [phone]            Test results pending upon discharge:  n/a    Operative procedures performed:      Treatments: statins, Lisinopril    Consults:  IP CONSULT TO CARDIOLOGY    Procedures:    * No surgery found *    Diet:  cardiac    Pertinent test results:  Xr Chest Pa Lat    Result Date: 5/25/2020  INDICATION:  Chest pain. Symptoms for 90 minutes Exam: Chest 2 views. Comparison: None. Findings: Cardiomediastinal silhouette is normal. Pulmonary vasculature is not engorged. No focal parenchymal opacities, effusions, or pneumothorax. Bony thorax is intact. Impression: 1. No acute cardiopulmonary disease     Cta Chest W Or W Wo Cont    Result Date: 5/26/2020  INDICATION: SOB, CP, elevated ddimer EXAM: CT Angio Chest: TECHNIQUE: Unenhanced localizing CT imaging of the pulmonary arteries is followed by bolus injection of 100 mL Isovue 370 contrast IV, with thin section axial Chest CT obtained and 3D image post processing performed including coronal MIPS.  CT dose reduction was achieved through use of a standardized protocol tailored for this examination and automatic exposure control for dose modulation. FINDINGS: There is no pulmonary embolism. There is no acute cor pulmonale. There is no aortic dissection or aneurysm. Lungs are clear. There is no pneumothorax. There is no pleural effusion or significant pericardial fluid. There is no significant adenopathy. Visualized thyroid and lower neck soft tissues are unremarkable for age. IMPRESSION: 1. No Pulmonary Embolus. 2. No Acute Findings. Recent Results (from the past 336 hour(s))   EKG, 12 LEAD, INITIAL    Collection Time: 05/25/20  4:57 PM   Result Value Ref Range    Ventricular Rate 59 BPM    Atrial Rate 59 BPM    P-R Interval 186 ms    QRS Duration 76 ms    Q-T Interval 442 ms    QTC Calculation (Bezet) 437 ms    Calculated P Axis 14 degrees    Calculated R Axis 1 degrees    Calculated T Axis 72 degrees    Diagnosis       Sinus bradycardia  No previous ECGs available  Confirmed by JASMIN Park, Darnell Krishnamurthy ([id number]) on 5/26/2020 5:19:18 PM     CBC WITH AUTOMATED DIFF    Collection Time: 05/25/20  5:10 PM   Result Value Ref Range    WBC 6.0 4.1 - 11.1 K/uL    RBC 5.02 4.10 - 5.70 M/uL    HGB 16.0 12.1 - 17.0 g/dL    HCT 47.2 36.6 - 50.3 %    MCV 94.0 80.0 - 99.0 FL    MCH 31.9 26.0 - 34.0 PG    MCHC 33.9 30.0 - 36.5 g/dL    RDW 12.1 11.5 - 14.5 %    PLATELET 356 926 - 531 K/uL    MPV 10.3 8.9 - 12.9 FL    NRBC 0.0 0  WBC    ABSOLUTE NRBC 0.00 0.00 - 0.01 K/uL    NEUTROPHILS 75 32 - 75 %    LYMPHOCYTES 18 12 - 49 %    MONOCYTES 5 5 - 13 %    EOSINOPHILS 1 0 - 7 %    BASOPHILS 1 0 - 1 %    IMMATURE GRANULOCYTES 0 0.0 - 0.5 %    ABS. NEUTROPHILS 4.5 1.8 - 8.0 K/UL    ABS. LYMPHOCYTES 1.1 0.8 - 3.5 K/UL    ABS. MONOCYTES 0.3 0.0 - 1.0 K/UL    ABS. EOSINOPHILS 0.1 0.0 - 0.4 K/UL    ABS. BASOPHILS 0.0 0.0 - 0.1 K/UL    ABS. IMM.  GRANS. 0.0 0.00 - 0.04 K/UL    DF AUTOMATED     METABOLIC PANEL, COMPREHENSIVE    Collection Time: 05/25/20  5:10 PM   Result Value Ref Range    Sodium 136 136 - 145 mmol/L    Potassium 4.0 3.5 - 5.1 mmol/L    Chloride 105 97 - 108 mmol/L    CO2 25 21 - 32 mmol/L    Anion gap 6 5 - 15 mmol/L    Glucose 124 (H) 65 - 100 mg/dL    BUN 16 6 - 20 MG/DL    Creatinine 1.07 0.70 - 1.30 MG/DL    BUN/Creatinine ratio 15 12 - 20      GFR est AA >60 >60 ml/min/1.73m2    GFR est non-AA >60 >60 ml/min/1.73m2    Calcium 8.5 8.5 - 10.1 MG/DL    Bilirubin, total 0.4 0.2 - 1.0 MG/DL    ALT (SGPT) 37 12 - 78 U/L    AST (SGOT) 30 15 - 37 U/L    Alk. phosphatase 104 45 - 117 U/L    Protein, total 7.5 6.4 - 8.2 g/dL    Albumin 3.7 3.5 - 5.0 g/dL    Globulin 3.8 2.0 - 4.0 g/dL    A-G Ratio 1.0 (L) 1.1 - 2.2     TROPONIN I    Collection Time: 05/25/20  5:10 PM   Result Value Ref Range    Troponin-I, Qt. <0.05 <0.05 ng/mL   SAMPLES BEING HELD    Collection Time: 05/25/20  5:10 PM   Result Value Ref Range    SAMPLES BEING HELD 1RED, 1BLU     COMMENT        Add-on orders for these samples will be processed based on acceptable specimen integrity and analyte stability, which may vary by analyte.    D DIMER    Collection Time: 05/25/20  5:10 PM   Result Value Ref Range    D-dimer 2.46 (H) 0.00 - 0.65 mg/L FEU   TROPONIN I    Collection Time: 05/25/20  8:02 PM   Result Value Ref Range    Troponin-I, Qt. <0.05 <0.05 ng/mL   HEMOGLOBIN A1C WITH EAG    Collection Time: 05/26/20  3:10 AM   Result Value Ref Range    Hemoglobin A1c 5.8 (H) 4.0 - 5.6 %    Est. average glucose 120 mg/dL   TSH 3RD GENERATION    Collection Time: 05/26/20  3:10 AM   Result Value Ref Range    TSH 1.82 0.36 - 3.74 uIU/mL   LIPID PANEL    Collection Time: 05/26/20  3:10 AM   Result Value Ref Range    LIPID PROFILE          Cholesterol, total 277 (H) <200 MG/DL    Triglyceride 187 (H) <150 MG/DL    HDL Cholesterol 48 MG/DL    LDL, calculated 191.6 (H) 0 - 100 MG/DL    VLDL, calculated 37.4 MG/DL    CHOL/HDL Ratio 5.8 (H) 0.0 - 5.0     TROPONIN I    Collection Time: 05/26/20  3:10 AM   Result Value Ref Range Troponin-I, Qt. <0.05 <0.05 ng/mL   NUCLEAR CARDIAC STRESS TEST    Collection Time: 05/26/20  1:28 PM   Result Value Ref Range    Target  bpm    Exercise duration time 00:03:00     Stress Base Systolic  mmHg    Stress Base Diastolic BP 87 mmHg    Post peak HR 83 BPM    Baseline HR 51 BPM    Estimated workload 1.0 METS    Baseline  mmHg    Percent HR 53 %    Stress Base Diastolic BP 86 mmHg    Stress Rate Pressure Product 15,853 BPM*mmHg    Recovery Stage 1 HR 64 bpm    Recovery Stage 1 /87 mmHg           Physical Exam on Discharge:    Discharge condition: fair    Vital signs:   Patient Vitals for the past 12 hrs:   Temp Pulse Resp BP SpO2   05/26/20 1717    175/57    05/26/20 1600  62      05/26/20 1515 98.2 °F (36.8 °C) 60 18 (!) 205/76 96 %   05/26/20 1249    191/87    05/26/20 1219    186/86    05/26/20 1100 98.3 °F (36.8 °C) (!) 59 18 170/72 98 %   05/26/20 0752 97.7 °F (36.5 °C) (!) 55 18 132/51 97 %       General appearance: alert, cooperative, no distress, appears stated age  Lungs: clear to auscultation bilaterally  Heart: regular rate and rhythm, S1, S2 normal, no murmur, click, rub or gallop  Abdomen: soft, non-tender. Bowel sounds normal. No masses,  no organomegaly    Current Discharge Medication List      START taking these medications    Details   atorvastatin (LIPITOR) 40 mg tablet Take 1 Tab by mouth nightly for 30 days. Qty: 30 Tab, Refills: 0      pantoprazole (PROTONIX) 40 mg tablet Take 1 Tab by mouth daily for 30 days. Qty: 30 Tab, Refills: 0      amLODIPine (NORVASC) 10 mg tablet Take 1 Tab by mouth daily for 30 days. Qty: 30 Tab, Refills: 0         CONTINUE these medications which have CHANGED    Details   lisinopriL (PRINIVIL, ZESTRIL) 5 mg tablet Take 2 Tabs by mouth daily for 30 days.   Qty: 60 Tab, Refills: 0               Total time spent on discharge planning, counseling and co-ordination of care:   36 minutes    Gayatri Black MD  05/26/20  2:37 PM

## 2020-05-26 NOTE — PROGRESS NOTES
Reason for Admission: Patient presented with chest pain                      RUR Score:  Patient is in OBS status, no re-admission score given                   Plan for utilizing home health:  No home health needs identified       PCP: First and Last name:     Name of Practice:    Are you a current patient: Yes/No:    Approximate date of last visit:     Patient does not have PCP- patient previously saw Dr. Luis Alfredo Velasquez, however, patient is now uninsured and has not been able to see her due to lack of insurance. Patient is agreeable for [facility]- the patient lives in [place] [address] Vicky Kristopher provided information on [facility] at bedside where patient can call for eligibility appointment over-the-pone. This clinic services [facility]. Current Advanced Directive/Advance Care Plan: Full Code, not on file                          Transition of Care Plan:  Home     The CM met with the patient at bedside in order to introduce the role of CM and assess for patient needs. The patient lives in [facility] with his wife, verified demographics. The patient is uninsured- he verbalized that he has not seen PCP (went to [facility] prior to admission where he endorses he spent $100 to get his BP checked, and could not afford additional testing). The patient is unemployed- he does not know his monthly income, endorses his wife handles finances. The patient is independent, endorses that medications/cost of appointments has been a barrier. The CM discussed [place] [address] program- CM placed FYI on the patient's chart for MD to prescribe/consider generic medications upon discharge. The patient was provided a You.Do application, as well as information for [facility]- patient has access to transportation. The patient was given resources at bedside. The CM will review discharging medications and provide GoodRX coupons at discharge for affordability.  The patient endorses his wife will transport home- he utilizes [address] for prescriptions. Observation notice provided in writing to patient and/or caregiver as well as verbal explanation of the policy. Patients who are in outpatient status also receive the Observation notice. Mario Heart, MSW     14:46 p.m.- Patient to be started on Lipitor and Protonix- costs $13.85, $17.71- Lisinopril is on the $4.00 list at [facility]. CM provided [employer] coupons, Julieta coupon- CM met with the patient at bedside and he can afford this, medications under $36.00- patient already has Lisinopril prescription. RN aware. Care Management Interventions  PCP Verified by CM: Yes(Patient does not have PCP- will refer to [address], [facility] ([address] [facility]))  Mode of Transport at Discharge:  Other (see comment)(Patient's spouse will transport home upon discharge)  Transition of Care Consult (CM Consult): Discharge Planning  MyChart Signup: No  Physical Therapy Consult: No  Occupational Therapy Consult: No  Speech Therapy Consult: No  Current Support Network: Own Home, Lives with Spouse  Confirm Follow Up Transport: Family  Discharge Location  Discharge Placement: Home

## 2020-05-27 ENCOUNTER — PATIENT OUTREACH (OUTPATIENT)
Dept: FAMILY MEDICINE CLINIC | Age: 65
End: 2020-05-27

## 2020-05-27 ENCOUNTER — TELEPHONE (OUTPATIENT)
Dept: CASE MANAGEMENT | Age: 65
End: 2020-05-27

## 2020-05-27 NOTE — TELEPHONE ENCOUNTER
20 1:16 PM    Patient contacted regarding recent discharge and COVID-19 risk. Discussed COVID-19 related testing which was not done at this time. Test results were not done. Patient informed of results, if available? N/A    Care Transition Nurse/ Ambulatory Care Manager contacted the patient by telephone to perform post discharge assessment. Verified name and  with patient as identifiers. Patient has following risk factors of: no known risk factors. CTN/ACM reviewed discharge instructions, medical action plan and red flags related to discharge diagnosis. Reviewed and educated them on any new and changed medications related to discharge diagnosis. Pt asked me to call his wife at home and talk to her since she helps him manage his health, so I did. She advised me that the new prescriptions are at the pharmacy and Mr. Duane Vides plans to pick them up today. She states he was on Lisinopril already and she gave him 2 pills today, as per the instructions at discharge. She declines offer of pharmacist's D/C med review at this time. She states she is concerned because he is currently uninsured and she is trying to get him connected to a clinic close to home. She informed me that she has their financial screening paperwork and is completing this. I asked if she knows about the [facility] Care Card and she states she does not but was given some type of financial form while pt was in the hospital.  I found her local [address] [place] [address] on Aging phone # for any questions with which they may be able to help and asked if our SW could give her a call. She is agreeable to this. She states that she tried to apply for Medicaid for him but was told he doesn't qualify. She was hoping they could make it to his 66th birthday when he would have Medicare. Advised Mrs. Duane Vides that pt needs to F/U with cardiology as soon as possible, and offered to help her make this appt since it is with Dr. Fernández Speak with [facility], but she is reluctant to make it until she has a better idea of the cost.  I gave her the office phone # so she can ask the questions on her mind prior to scheduling the appt    Education provided regarding infection prevention, and signs and symptoms of COVID-19 and when to seek medical attention with patient who verbalized understanding. Discussed exposure protocols and quarantine from [address] you at higher risk for severe illness 2019 and given an opportunity for questions and concerns. The patient agrees to contact the COVID-19 hotline [phone] or PCP office for questions related to their healthcare. CTN/ACM provided contact information for future reference. From CDC: Are you at higher risk for severe illness?  Wash your hands often.  Avoid close contact (6 feet, which is about two arm lengths) with people who are sick.  Put distance between yourself and other people if COVID-19 is spreading in your community.  Clean and disinfect frequently touched surfaces.  Avoid all cruise travel and non-essential air travel.  Call your healthcare professional if you have concerns about COVID-19 and your underlying condition or if you are sick. For more information on steps you can take to protect yourself, see CDC's How to Rexann Nice Mrs. Sayra Chow our [employer] #s for any questions/concerns. Reviewed pt's health care decision makers with Mrs. Sayra Chow and she confirms the information is accurate and current. Patient/family/caregiver given information for [employer] and agrees to enroll no  Patient's preferred e-mail:  N/A  Patient's preferred phone number: N/A  Based on Loop alert triggers, patient will be contacted by nurse care manager for worsening symptoms. Plan for follow-up call in 7-14 days based on severity of symptoms and risk factors.

## 2020-05-27 NOTE — PROGRESS NOTES
Social Work Note  2020    Referral received from Melo Rm RN for assistance with insurance/financial assistance program through [facility]. Chart reviewed. Contacted patient's wife, Halina Figueroa. Patient information verified by name/. Ms. Laurence Talbert stated that her  does not currently have insurance, but will be eligible for Medicare in less than a year. Reviewed income and eligibility for Medicaid. Ms. Laurence Talbert stated that she has Medicare and applied for Medicaid in March and was denied due to income. From information provided, it appears that the combination of family income is over the limit for Medicaid coverage through the [employer] program.     Discussed financial assistance program through [facility]. Ms. Laurence Talbert indicated that they do have the form. Encouraged her to submit the form as soon as possible for consideration. Advised that if they are eligible for financial assistance, that some [facility] physician practices participate in the program.      Spoke with Ms. Marin re: follow up recommended with Dr. Moon/Cardiology. Ms. Laurence Talbert voiced concern with cost of visit and subsequent testing. Encouraged Ms. Marin to talk with office re: concerns and advise of application to financial assistance program.  Explained option of PCP care through [address] and provided number to schedule appointment. Ms. Laurence Talbert voiced interest in locating new PCP near home. SW to research options and return call. 4:45 pm  Message left on voicemail of Ms. Laurence Talbert requesting return call. Will discuss locations of PCP and assistance for medication costs.      Florentino Gates, MSW, ACSW, CCM    [facility] Management Team  [phone]

## 2020-05-28 ENCOUNTER — PATIENT OUTREACH (OUTPATIENT)
Dept: FAMILY MEDICINE CLINIC | Age: 65
End: 2020-05-28

## 2020-05-28 ENCOUNTER — TELEPHONE (OUTPATIENT)
Dept: INTERNAL MEDICINE CLINIC | Age: 65
End: 2020-05-28

## 2020-05-28 NOTE — TELEPHONE ENCOUNTER
5/28/20 11:37 AM--received a call from our SW who is helping Mr. Jack Farooq find resources for PCP as an uninsured person. She has just spoken to his wife who tells her that his BP is quite elevated. SW and I discussed the situation and whether he obtained his new medications and has started taking them and whether the BP cuff is working since SW stated wife said she had received an error message a couple of times. I agreed to call Mrs. Jack Farooq and discuss further. Reached Mrs. Jack Farooq and she confirmed that pt took Amlodipine 10 mg last night and Lisinopril 10 mg this morning. She repeated his BP while I was on the phone and told me it was 209/80. This is a loaner BP cuff from a family member but came from a [facility] pharmacy. Mrs. Jack Farooq is unaware that cuffs need to match the size of a person's upper arm for accurate measurement, so we are unsure if this is fitting him properly. She states that he is having HA and that his eyes \"feel funny. \"  She tells me she has a family member who is trying to get her in with a PCP today who charges a reduced fee. I advised in the meanwhile, I will research [address] [place] [address] to try to keep him from having to go to the ED. Found one  urgent care that is currently accepting general patients for a one-time urgent care visit--they normally see [place] and [address] [place] [address] [employer] employees only. JUAN informed me that Patrick Rodriguez is accepting new patients by appt but the individual must call between 6-8AM to schedule. Called back to speak to Mrs. Jack Farooq and gave her this information on  Urgent Care on [address] [place] [address]. She thanked me but states they may wait a couple of hours to see if her friend can find the PCP's phone # and otherwise, they may go to an urgent care closer to them. Advised that if he qualifies for the Care Card, it will only be useful with  expenses. Also, encouraged her to take his D/C paperwork and all medications with them as well as BP readings.   Asked her to take his BP once an hour and record this information while they are deciding where to go for care. She verbalizes understanding and agreed to a call back from me tomorrow to check in.

## 2020-05-28 NOTE — PROGRESS NOTES
Social Work Note  5/28/2020    11:12 am  Received return call from patient's wife, Baudilio Oviedo. SW asked about medication cost and purchase. Ms. Laura Rosenthal reported that they did purchase medications yesterday. Ms. Laura Rosenthal also confirmed that [facility]. Ms. Laura Rosenthal voiced concern that patient's medications \"may need to be adjusted\". She stated that last night patient's blood pressure reading was \"180/something\" at 8pm after medications at 6pm.  She also stated that patient took medications (lisinopril, pantoprazole, and aspirin) this morning at 6am and blood pressure at 8am was \"200/something\". Ms. Laura Rosenthal took blood pressure reading for patient while on call with SW and indicated that it was still \"200/something\". She also stated that he awoke with a headache that has continued. SW spoke with patient who confirmed headache and stated that his \"eyeballs are aching\". SW advised patient and Ms. Marin that Marycruz Gonzales NP, nurse who initially contacted will be contacted by SW and SW will return call. Consulted with Marycruz Gonzales NP and advised of patient's current status and schedule for medication administration. Meds given last night (5/27):  Amlodipine, Atorvastatin   Meds given this am (5/28): Lisinopril, Pantoprazole, aspirin  Advised that patient and Ms. Marin to not have ability to conduct virtual visit with phone. Marycruz Gonzales to contact patient and wife for assessment and further directions regarding care. SW contacted patient and his wife to advise of call from Marycruz Gonzales NP for assessment. SW continuing to follow.      Lowell Eller, MSW, ACSW, CCM    [facility] Management Team  [phone]

## 2020-05-29 ENCOUNTER — OFFICE VISIT (OUTPATIENT)
Dept: PRIMARY CARE CLINIC | Age: 65
End: 2020-05-29

## 2020-05-29 VITALS
OXYGEN SATURATION: 98 % | HEART RATE: 57 BPM | BODY MASS INDEX: 33.12 KG/M2 | HEIGHT: 67 IN | RESPIRATION RATE: 21 BRPM | TEMPERATURE: 97.7 F | SYSTOLIC BLOOD PRESSURE: 156 MMHG | DIASTOLIC BLOOD PRESSURE: 74 MMHG | WEIGHT: 211 LBS

## 2020-05-29 DIAGNOSIS — G44.219 EPISODIC TENSION-TYPE HEADACHE, NOT INTRACTABLE: ICD-10-CM

## 2020-05-29 DIAGNOSIS — I10 ESSENTIAL HYPERTENSION: Primary | ICD-10-CM

## 2020-05-29 NOTE — PATIENT INSTRUCTIONS
High Blood Pressure: Care Instructions Overview It's normal for blood pressure to go up and down throughout the day. But if it stays up, you have high blood pressure. Another name for high blood pressure is hypertension. Despite what a lot of people think, high blood pressure usually doesn't cause headaches or make you feel dizzy or lightheaded. It usually has no symptoms. But it does increase your risk of stroke, heart attack, and other problems. You and your doctor will talk about your risks of these problems based on your blood pressure. Your doctor will give you a goal for your blood pressure. Your goal will be based on your health and your age. Lifestyle changes, such as eating healthy and being active, are always important to help lower blood pressure. You might also take medicine to reach your blood pressure goal. 
Follow-up care is a key part of your treatment and safety. Be sure to make and go to all appointments, and call your doctor if you are having problems. It's also a good idea to know your test results and keep a list of the medicines you take. How can you care for yourself at home? Medical treatment · If you stop taking your medicine, your blood pressure will go back up. You may take one or more types of medicine to lower your blood pressure. Be safe with medicines. Take your medicine exactly as prescribed. Call your doctor if you think you are having a problem with your medicine. · Talk to your doctor before you start taking aspirin every day. Aspirin can help certain people lower their risk of a heart attack or stroke. But taking aspirin isn't right for everyone, because it can cause serious bleeding. · See your doctor regularly. You may need to see the doctor more often at first or until your blood pressure comes down. · If you are taking blood pressure medicine, talk to your doctor before you take decongestants or anti-inflammatory medicine, such as ibuprofen. Some of these medicines can raise blood pressure. · Learn how to check your blood pressure at home. Lifestyle changes · Stay at a healthy weight. This is especially important if you put on weight around the waist. Losing even 10 pounds can help you lower your blood pressure. · If your doctor recommends it, get more exercise. Walking is a good choice. Bit by bit, increase the amount you walk every day. Try for at least 30 minutes on most days of the week. You also may want to swim, bike, or do other activities. · Avoid or limit alcohol. Talk to your doctor about whether you can drink any alcohol. · Try to limit how much sodium you eat to less than 2,300 milligrams (mg) a day. Your doctor may ask you to try to eat less than 1,500 mg a day. · Eat plenty of fruits (such as bananas and oranges), vegetables, legumes, whole grains, and low-fat dairy products. · Lower the amount of saturated fat in your diet. Saturated fat is found in animal products such as milk, cheese, and meat. Limiting these foods may help you lose weight and also lower your risk for heart disease. · Do not smoke. Smoking increases your risk for heart attack and stroke. If you need help quitting, talk to your doctor about stop-smoking programs and medicines. These can increase your chances of quitting for good. When should you call for help? Call  911 anytime you think you may need emergency care. This may mean having symptoms that suggest that your blood pressure is causing a serious heart or blood vessel problem. Your blood pressure may be over 180/120. For example, call 911 if: 
· You have symptoms of a heart attack. These may include: 
? Chest pain or pressure, or a strange feeling in the chest. 
? Sweating. ? Shortness of breath. ? Nausea or vomiting. ? Pain, pressure, or a strange feeling in the back, neck, jaw, or upper belly or in one or both shoulders or arms. ? Lightheadedness or sudden weakness. ? A fast or irregular heartbeat. · You have symptoms of a stroke. These may include: 
? Sudden numbness, tingling, weakness, or loss of movement in your face, arm, or leg, especially on only one side of your body. ? Sudden vision changes. ? Sudden trouble speaking. ? Sudden confusion or trouble understanding simple statements. ? Sudden problems with walking or balance. ? A sudden, severe headache that is different from past headaches. · You have severe back or belly pain. Do not wait until your blood pressure comes down on its own. Get help right away. Call your doctor now or seek immediate care if: 
· Your blood pressure is much higher than normal (such as 180/120 or higher), but you don't have symptoms. · You think high blood pressure is causing symptoms, such as: 
? Severe headache. 
? Blurry vision. Watch closely for changes in your health, and be sure to contact your doctor if: 
· Your blood pressure measures higher than your doctor recommends at least 2 times. That means the top number is higher or the bottom number is higher, or both. · You think you may be having side effects from your blood pressure medicine. Where can you learn more? Go to http://libertad-brodie.info/ Enter Q997 in the search box to learn more about \"High Blood Pressure: Care Instructions. \" Current as of: December 16, 2019               Content Version: 12.5 © 4623-5706 [employer]. Care instructions adapted under license by [employer] (which disclaims liability or warranty for this information). If you have questions about a medical condition or this instruction, always ask your healthcare professional. [place] [address] any warranty or liability for your use of this information.

## 2020-05-29 NOTE — PROGRESS NOTES
Subjective:     Alicja Espinoza is a 60 y.o. male who presents for follow up of hypertension. Pt was evaluated in ED and subsequently hospitalized earlier this week for chest pain. Chest pain work-up for MI was negative but he was found to be hypertensive due to non-compliance. CP was thought to be GERD related and he was discharged on PPI. Wife is present with him today and has been monitoring BP at home with her sister's wrist monitor. She is concerned about the high readings - 200's/100's. See her recorded readings in scanned media. He is not free of chest pain but has bitemporal throbbing headache. Had similar headache yesterday which was relieved with Aleve. Has not taken anything for headache today. He is currently uninsured. Cardiovascular ROS: taking medications as instructed, no medication side effects noted, no TIA's, no chest pain on exertion, no dyspnea on exertion, no swelling of ankles. New concerns: as noted above. Review of Systems, additional:  Pertinent items are noted in HPI. Objective:     Visit Vitals  /74 (BP 1 Location: Left arm, BP Patient Position: Sitting)   Pulse (!) 57   Temp 97.7 °F (36.5 °C) (Oral)   Resp 21   Ht 5' 7\" (1.702 m)   Wt 211 lb (95.7 kg)   SpO2 98%   BMI 33.05 kg/m²     Appearance: alert, well appearing, and in no distress. General exam: CVS exam BP noted to be mildly elevated today in office, S1, S2 normal, no gallop, no murmur, chest clear, no JVD, no HSM, no edema. BP by me with manual cuff:  162/86 - right arm  156/74 - left arm    Assessment/Plan:       ICD-10-CM ICD-9-CM    1. Essential hypertension I10 401.9    2. Episodic tension-type headache, not intractable G44.219 339.11      Reviewed meds with the patient and his wife. In addition, it is noted on AVS what meds are for as pt had questions. Don't recommend any changes in his meds at this time and stressed importance of med compliance.   We tried the wrist BP monitor to compare with my readings and I don't think it's reliable - three times invalid reading and then one extremely high reading. I wrote script for home BP monitor and our pharmacy offered them $30 cash price. He is to follow-up with his cardiologist but his wife has not yet scheduled this. I encouraged them to schedule appt to be seen within next 1-2 weeks. For his headache, may take OTC analgesics prn. RTC prn.    5/30/2020 - Addendum: Will send pt PCP referral via mail. Derrick Haji NP  This note will not be viewable in [address]

## 2020-05-29 NOTE — PROGRESS NOTES
Mariela Palacios is a 60 y.o. male    Room:5    Chief Complaint   Patient presents with    Hypertension     Pt States \" he went to the hospital on memorial day because his chest with numb and then it would relax, he has never felt that before. They kept him overnight because his BP was to high. they gave him BP medication but his blood pressure is still to high. he is now having headaches and he said that his eyes were hurting and i dont want him to have a stroke. he was given a stress test at the hospital and has to follow up with a cardiologist. i would like his BP medication changed if possible\". Visit Vitals  /77 (BP 1 Location: Left arm, BP Patient Position: Sitting)   Pulse (!) 57   Temp 97.7 °F (36.5 °C) (Oral)   Resp 21   Ht 5' 7\" (1.702 m)   Wt 211 lb (95.7 kg)   SpO2 98%   BMI 33.05 kg/m²       Pain Scale: 3/10     1. Have you been to the ER, urgent care clinic since your last visit? Hospitalized since your last visit? No    2. Have you seen or consulted any other health care providers outside of the [address] [place] [address] since your last visit? Include any pap smears or colon screening.   [facility] on [employer] day

## 2020-05-30 ENCOUNTER — DOCUMENTATION ONLY (OUTPATIENT)
Dept: PRIMARY CARE CLINIC | Age: 65
End: 2020-05-30

## 2020-06-03 NOTE — PROGRESS NOTES
TELEPHONE VISIT DOCUMENTATION     Pursuant to the emergency declaration under the [address] waiver authority and the [employer] Act, this Telephone Visit was conducted, with patient's consent, to reduce the patient's risk of exposure to COVID-19 and provide continuity of care for an established patient. He and/or health care decision maker is aware that that he may receive a bill for this telephone service, depending on his insurance coverage, and has provided verbal consent to proceed. This is a Patient Initiated Episode with an Established Patient who has not had a related appointment within my department in the past 7 days or scheduled within the next 24 hours. HISTORY OF PRESENTING ILLNESS      Tonio Vital is a 60 y.o. male evaluated via telephone on 6/4/2020 due to COVID 19 restrictions. Patient unable to participate in Virtual Visit with synchronous audio/visual technology. History of Present Illness:  Mr. Jamir Huertas is a 61 yo M with a history of hypertension, mixed hyperlipidemia, remote tobacco use (quit in 2002), family history of early coronary artery disease, referred by Dr. Greg Talbert for cardiac evaluation. He went to the emergency room recently for chest pain that occurred over THE [facility] weekend. He had a friend that recently passed away and while lying in bed he developed chest discomfort that was substernal lasting a few minutes at a time that was \"on and off\" throughout the day. He also had headaches and he was short of breath when the discomfort was happening. He denies any palpitations, lightheadedness or dizziness. Workup in the hospital included a CT scan that noted no PE. He also had a stress test that was negative for ischemia. Since then, he has felt better. He only has rare chest discomfort. This has not been exertional and only when he lies down.   We discussed the results of the stress test.    Soc hx. Stopped tobacco 2002  Fam hx. Father CAD 46s. Brother CABG. Assessment and Plan:    1. Chest pain. Noncardiac pain. Stress test was normal and his symptoms sound like they could be GI related, as they mostly only occur when he is lying down. He was given a prescription for Protonix, but do not know if he is taking this and will make sure he has a prescription for this. He will follow up with his primary care physician. No additional cardiac evaluation is indicated. 2. Essential hypertension. Blood pressure has been labile. He has recently started medications. He will follow up with his primary care physician. 3. Mixed hyperlipidemia. Tolerating statin. 4. Family history of early coronary artery disease. 5. Remote tobacco use. ASSESSMENT/PLAN:      We discussed the expected course, resolution and complications of the diagnosis(es) in detail. Medication risks, benefits, costs, interactions, and alternatives were discussed as indicated. I advised him to contact the office if his condition worsens, changes or fails to improve as anticipated.  He expressed understanding with the diagnosis(es) and plan       ACTIVE PROBLEM LIST     Patient Active Problem List    Diagnosis Date Noted    Chest pain 05/25/2020    BP (high blood pressure) 07/07/2011    Chest pain, unspecified 07/07/2011    Other and unspecified hyperlipidemia 07/07/2011           PAST MEDICAL HISTORY     Past Medical History:   Diagnosis Date    Essential hypertension     Hyperlipidemia            PAST SURGICAL HISTORY     Past Surgical History:   Procedure Laterality Date    HX CATARACT REMOVAL      HX ORTHOPAEDIC Right     right shoulder          ALLERGIES     Allergies   Allergen Reactions    Penicillins Rash          FAMILY HISTORY     Family History   Problem Relation Age of Onset    Cancer Mother     Heart Disease Father     Heart Disease Brother     negative for cardiac disease       SOCIAL HISTORY     Social History     Socioeconomic History    Marital status:      Spouse name: Not on file    Number of children: Not on file    Years of education: Not on file    Highest education level: Not on file   Tobacco Use    Smoking status: Former Smoker     Last attempt to quit: 2002     Years since quittin.9    Smokeless tobacco: Never Used         MEDICATIONS     Current Outpatient Medications   Medication Sig    BABY ASPIRIN PO Take  by mouth.  atorvastatin (LIPITOR) 40 mg tablet Take 1 Tab by mouth nightly for 30 days.  pantoprazole (PROTONIX) 40 mg tablet Take 1 Tab by mouth daily for 30 days.  lisinopriL (PRINIVIL, ZESTRIL) 5 mg tablet Take 2 Tabs by mouth daily for 30 days.  amLODIPine (NORVASC) 10 mg tablet Take 1 Tab by mouth daily for 30 days. No current facility-administered medications for this visit. I have reviewed the nurses notes, vitals, problem list, allergy list, medical history, family, social history and medications. REVIEW OF SYMPTOMS     Constitutional: Negative for fever, chills, malaise/fatigue and diaphoresis. Respiratory: Negative for cough, hemoptysis, sputum production, shortness of breath and wheezing. Cardiovascular: Negative for chest pain, palpitations, orthopnea, claudication, leg swelling and PND. Gastrointestinal: Negative for heartburn, nausea, vomiting, blood in stool and melena. Genitourinary: Negative for dysuria and flank pain. Musculoskeletal: Negative for joint pain and back pain. Skin: Negative for rash. Neurological: Negative for focal weakness, seizures, loss of consciousness, weakness and headaches. Endo/Heme/Allergies: Negative for abnormal bleeding. Psychiatric/Behavioral: Negative for memory loss. DIAGNOSTIC DATA      No specialty comments available.        LABORATORY DATA      Lab Results   Component Value Date/Time    WBC 6.0 2020 05:10 PM    HGB 16.0 2020 05:10 PM    HCT 47.2 05/25/2020 05:10 PM    PLATELET 200 76/15/8104 05:10 PM    MCV 94.0 05/25/2020 05:10 PM      Lab Results   Component Value Date/Time    Sodium 136 05/25/2020 05:10 PM    Potassium 4.0 05/25/2020 05:10 PM    Chloride 105 05/25/2020 05:10 PM    CO2 25 05/25/2020 05:10 PM    Anion gap 6 05/25/2020 05:10 PM    Glucose 124 (H) 05/25/2020 05:10 PM    BUN 16 05/25/2020 05:10 PM    Creatinine 1.07 05/25/2020 05:10 PM    BUN/Creatinine ratio 15 05/25/2020 05:10 PM    GFR est AA >60 05/25/2020 05:10 PM    GFR est non-AA >60 05/25/2020 05:10 PM    Calcium 8.5 05/25/2020 05:10 PM    Bilirubin, total 0.4 05/25/2020 05:10 PM    Alk.  phosphatase 104 05/25/2020 05:10 PM    Protein, total 7.5 05/25/2020 05:10 PM    Albumin 3.7 05/25/2020 05:10 PM    Globulin 3.8 05/25/2020 05:10 PM    A-G Ratio 1.0 (L) 05/25/2020 05:10 PM    ALT (SGPT) 37 05/25/2020 05:10 PM            Total Time: 15 minutes      Kendra Branch MD    [address]  [address] [facility] [address] [facility] , [address] [facility] [address]  [facility]  [phone] / [phone] Fax

## 2020-06-04 ENCOUNTER — VIRTUAL VISIT (OUTPATIENT)
Dept: CARDIOLOGY CLINIC | Age: 65
End: 2020-06-04

## 2020-06-04 VITALS — WEIGHT: 211 LBS | HEIGHT: 67 IN | BODY MASS INDEX: 33.12 KG/M2

## 2020-06-04 DIAGNOSIS — I10 BENIGN ESSENTIAL HTN: ICD-10-CM

## 2020-06-04 DIAGNOSIS — R07.89 OTHER CHEST PAIN: Primary | ICD-10-CM

## 2020-06-04 DIAGNOSIS — E78.2 MIXED HYPERLIPIDEMIA: ICD-10-CM

## 2020-06-04 DIAGNOSIS — Z87.891 HISTORY OF TOBACCO USE: ICD-10-CM

## 2020-06-04 DIAGNOSIS — Z82.49 FAMILY HISTORY OF EARLY CAD: ICD-10-CM

## 2020-06-04 NOTE — LETTER
6/4/2020 3:00 PM 
 
Patient:  Cleo Thorne YOB: 1955 Date of Visit: 6/4/2020 Dear Devorah Bses MD 
[id number] [id number] VIA Facsimile: [phone]: 
 
Mr. Theresa Hernandez is a 61 yo M with a history of hypertension, mixed hyperlipidemia, remote tobacco use (quit in 2002), family history of early coronary artery disease, referred by Dr. Shemar Bourne for cardiac evaluation. He went to the emergency room recently for chest pain that occurred over THE [facility] weekend. He had a friend that recently passed away and while lying in bed he developed chest discomfort that was substernal lasting a few minutes at a time that was \"on and off\" throughout the day. He also had headaches and he was short of breath when the discomfort was happening. He denies any palpitations, lightheadedness or dizziness. Workup in the hospital included a CT scan that noted no PE. He also had a stress test that was negative for ischemia. Since then, he has felt better. He only has rare chest discomfort. This has not been exertional and only when he lies down. We discussed the results of the stress test.   
Soc hx. Stopped tobacco 2002 Fam hx. Father CAD 46s. Brother CABG. Assessment and Plan: 1. Chest pain. Noncardiac pain. Stress test was normal and his symptoms sound like they could be GI related, as they mostly only occur when he is lying down. He was given a prescription for Protonix, but do not know if he is taking this and will make sure he has a prescription for this. He will follow up with his primary care physician. No additional cardiac evaluation is indicated. 2. Essential hypertension. Blood pressure has been labile. He has recently started medications. He will follow up with his primary care physician. 3. Mixed hyperlipidemia. Tolerating statin. 4. Family history of early coronary artery disease. 5. Remote tobacco use. If you have questions, please do not hesitate to call me. Sincerely, Gonzalo Lyles MD

## 2020-06-12 ENCOUNTER — TELEPHONE (OUTPATIENT)
Dept: INTERNAL MEDICINE CLINIC | Age: 65
End: 2020-06-12

## 2020-06-12 NOTE — TELEPHONE ENCOUNTER
6/12/20 5:25 PM     Patient resolved from Transition of Care episode on 6/12/20. ACM/CTN was unsuccessful at contacting this patient today. Patient/family was provided the following resources and education related to COVID-19 during the initial call:                         Signs, symptoms and red flags related to COVID-19            CDC exposure and quarantine guidelines            [facility] exposure contact - [phone]            Contact for their local Department of Health                 Patient has not had any additional ED or hospital visits. No further outreach scheduled with this CTN/ACM. Episode of Care resolved. Patient has this CTN/ACM contact information if future needs arise.

## 2020-06-17 ENCOUNTER — PATIENT OUTREACH (OUTPATIENT)
Dept: FAMILY MEDICINE CLINIC | Age: 65
End: 2020-06-17

## 2020-06-17 NOTE — PROGRESS NOTES
Social Work Note  6/17/2020    Attempted to reach patient/wife at [phone], Ms. Author Leaks' cell phone. Left message and advised that SW was checking on status of connecting with PCP. Also reminded patient/wife of availability of some of patient's medications through $4 medication program at [facility]. SW left name/contact number for further assistance if needed. No further SW needs at this time.      Yasir Toussaint, MSW, ACSW, [facility]    [facility] Management Team  [phone]

## 2022-03-19 PROBLEM — R07.9 CHEST PAIN: Status: ACTIVE | Noted: 2020-05-25

## 2022-04-30 ENCOUNTER — TRANSCRIBE ORDER (OUTPATIENT)
Dept: SCHEDULING | Age: 67
End: 2022-04-30

## 2022-04-30 DIAGNOSIS — H49.21: Primary | ICD-10-CM

## 2022-05-09 ENCOUNTER — TRANSCRIBE ORDER (OUTPATIENT)
Dept: SCHEDULING | Age: 67
End: 2022-05-09

## 2022-05-09 DIAGNOSIS — H49.21 ABDUCENT NERVE PALSY, RIGHT EYE: Primary | ICD-10-CM

## 2022-05-19 ENCOUNTER — HOSPITAL ENCOUNTER (OUTPATIENT)
Dept: MRI IMAGING | Age: 67
Discharge: HOME | End: 2022-05-19
Attending: OPHTHALMOLOGY
Payer: MEDICARE

## 2022-05-19 ENCOUNTER — TRANSCRIBE ORDER (OUTPATIENT)
Dept: SCHEDULING | Age: 67
End: 2022-05-19

## 2022-05-19 DIAGNOSIS — H49.21: ICD-10-CM

## 2022-05-19 DIAGNOSIS — Q27.39 ARTERIOVENOUS MALFORMATION, OTHER SITE: Primary | ICD-10-CM

## 2022-05-19 PROCEDURE — 70553 MRI BRAIN STEM W/O & W/DYE: CPT

## 2022-05-19 PROCEDURE — A9576 INJ PROHANCE MULTIPACK: HCPCS

## 2022-05-19 PROCEDURE — 74011250636 HC RX REV CODE- 250/636

## 2022-05-19 RX ADMIN — GADOTERIDOL 20 ML: 279.3 INJECTION, SOLUTION INTRAVENOUS at 16:00
